# Patient Record
Sex: FEMALE | Race: WHITE
[De-identification: names, ages, dates, MRNs, and addresses within clinical notes are randomized per-mention and may not be internally consistent; named-entity substitution may affect disease eponyms.]

---

## 2018-09-13 ENCOUNTER — HOSPITAL ENCOUNTER (EMERGENCY)
Dept: HOSPITAL 84 - D.ER | Age: 53
Discharge: HOME | End: 2018-09-13
Payer: SELF-PAY

## 2018-09-13 VITALS
HEIGHT: 64 IN | BODY MASS INDEX: 33.87 KG/M2 | WEIGHT: 198.42 LBS | BODY MASS INDEX: 33.87 KG/M2 | WEIGHT: 198.42 LBS | HEIGHT: 64 IN

## 2018-09-13 VITALS — DIASTOLIC BLOOD PRESSURE: 98 MMHG | SYSTOLIC BLOOD PRESSURE: 190 MMHG

## 2018-09-13 DIAGNOSIS — L02.214: Primary | ICD-10-CM

## 2018-10-25 ENCOUNTER — HOSPITAL ENCOUNTER (EMERGENCY)
Dept: HOSPITAL 84 - D.ER | Age: 53
Discharge: HOME | End: 2018-10-25
Payer: COMMERCIAL

## 2018-10-25 VITALS — DIASTOLIC BLOOD PRESSURE: 78 MMHG | SYSTOLIC BLOOD PRESSURE: 164 MMHG

## 2018-10-25 VITALS — HEIGHT: 64 IN | WEIGHT: 192.4 LBS | BODY MASS INDEX: 32.85 KG/M2

## 2018-10-25 DIAGNOSIS — R73.9: ICD-10-CM

## 2018-10-25 DIAGNOSIS — R07.9: ICD-10-CM

## 2018-10-25 DIAGNOSIS — R20.2: ICD-10-CM

## 2018-10-25 DIAGNOSIS — R06.02: ICD-10-CM

## 2018-10-25 DIAGNOSIS — F17.200: ICD-10-CM

## 2018-10-25 DIAGNOSIS — I10: Primary | ICD-10-CM

## 2018-10-25 LAB
ALBUMIN SERPL-MCNC: 3.5 G/DL (ref 3.4–5)
ALP SERPL-CCNC: 144 U/L (ref 46–116)
ALT SERPL-CCNC: 15 U/L (ref 10–68)
ANION GAP SERPL CALC-SCNC: 12.8 MMOL/L (ref 8–16)
APPEARANCE UR: CLEAR
BACTERIA #/AREA URNS HPF: (no result) /HPF
BASOPHILS NFR BLD AUTO: 0.9 % (ref 0–2)
BILIRUB SERPL-MCNC: 0.31 MG/DL (ref 0.2–1.3)
BILIRUB SERPL-MCNC: NEGATIVE MG/DL
BUN SERPL-MCNC: 14 MG/DL (ref 7–18)
CALCIUM SERPL-MCNC: 9.3 MG/DL (ref 8.5–10.1)
CHLORIDE SERPL-SCNC: 102 MMOL/L (ref 98–107)
CO2 SERPL-SCNC: 26.2 MMOL/L (ref 21–32)
COLOR UR: YELLOW
CREAT SERPL-MCNC: 0.8 MG/DL (ref 0.6–1.3)
EOSINOPHIL NFR BLD: 4.5 % (ref 0–7)
ERYTHROCYTE [DISTWIDTH] IN BLOOD BY AUTOMATED COUNT: 12.8 % (ref 11.5–14.5)
GLOBULIN SER-MCNC: 3.8 G/L
GLUCOSE SERPL-MCNC: 1000 MG/DL
GLUCOSE SERPL-MCNC: 381 MG/DL (ref 74–106)
HCT VFR BLD CALC: 47.9 % (ref 36–48)
HGB BLD-MCNC: 17.2 G/DL (ref 12–16)
IMM GRANULOCYTES NFR BLD: 0.4 % (ref 0–5)
KETONES UR STRIP-MCNC: NEGATIVE MG/DL
LYMPHOCYTES NFR BLD AUTO: 33.4 % (ref 15–50)
MAGNESIUM SERPL-MCNC: 1.9 MG/DL (ref 1.8–2.4)
MCH RBC QN AUTO: 31.8 PG (ref 26–34)
MCHC RBC AUTO-ENTMCNC: 35.9 G/DL (ref 31–37)
MCV RBC: 88.5 FL (ref 80–100)
MONOCYTES NFR BLD: 8.2 % (ref 2–11)
NEUTROPHILS NFR BLD AUTO: 52.6 % (ref 40–80)
NITRITE UR-MCNC: NEGATIVE MG/ML
OSMOLALITY SERPL CALC.SUM OF ELEC: 290 MOSM/KG (ref 275–300)
PH UR STRIP: 5 [PH] (ref 5–6)
PLATELET # BLD: 237 10X3/UL (ref 130–400)
PMV BLD AUTO: 11.3 FL (ref 7.4–10.4)
POTASSIUM SERPL-SCNC: 4 MMOL/L (ref 3.5–5.1)
PROT SERPL-MCNC: 7.3 G/DL (ref 6.4–8.2)
PROT UR-MCNC: NEGATIVE MG/DL
RBC # BLD AUTO: 5.41 10X6/UL (ref 4–5.4)
RBC #/AREA URNS HPF: (no result) /HPF (ref 0–5)
SODIUM SERPL-SCNC: 137 MMOL/L (ref 136–145)
SP GR UR STRIP: 1.02 (ref 1–1.02)
SQUAMOUS #/AREA URNS HPF: (no result) /HPF (ref 0–5)
TROPONIN I SERPL-MCNC: < 0.017 NG/ML (ref 0–0.06)
UROBILINOGEN UR-MCNC: NORMAL MG/DL
WBC # BLD AUTO: 9.7 10X3/UL (ref 4.8–10.8)
WBC #/AREA URNS HPF: (no result) /HPF (ref 0–5)

## 2019-07-02 ENCOUNTER — HOSPITAL ENCOUNTER (OUTPATIENT)
Dept: HOSPITAL 84 - D.MAMMO | Age: 54
Discharge: HOME | End: 2019-07-02
Attending: NURSE PRACTITIONER
Payer: COMMERCIAL

## 2019-07-02 VITALS — BODY MASS INDEX: 33 KG/M2

## 2019-07-02 DIAGNOSIS — Z12.31: Primary | ICD-10-CM

## 2019-08-01 ENCOUNTER — HOSPITAL ENCOUNTER (OUTPATIENT)
Dept: HOSPITAL 84 - D.ER | Age: 54
Setting detail: OBSERVATION
LOS: 1 days | Discharge: HOME | End: 2019-08-02
Attending: INTERNAL MEDICINE | Admitting: INTERNAL MEDICINE
Payer: COMMERCIAL

## 2019-08-01 VITALS — BODY MASS INDEX: 33.02 KG/M2 | HEIGHT: 64 IN | BODY MASS INDEX: 33.02 KG/M2 | WEIGHT: 193.4 LBS

## 2019-08-01 VITALS — SYSTOLIC BLOOD PRESSURE: 132 MMHG | DIASTOLIC BLOOD PRESSURE: 62 MMHG

## 2019-08-01 VITALS — SYSTOLIC BLOOD PRESSURE: 148 MMHG | DIASTOLIC BLOOD PRESSURE: 76 MMHG

## 2019-08-01 VITALS — DIASTOLIC BLOOD PRESSURE: 70 MMHG | SYSTOLIC BLOOD PRESSURE: 131 MMHG

## 2019-08-01 VITALS — SYSTOLIC BLOOD PRESSURE: 163 MMHG | DIASTOLIC BLOOD PRESSURE: 81 MMHG

## 2019-08-01 VITALS — SYSTOLIC BLOOD PRESSURE: 144 MMHG | DIASTOLIC BLOOD PRESSURE: 81 MMHG

## 2019-08-01 VITALS — SYSTOLIC BLOOD PRESSURE: 161 MMHG | DIASTOLIC BLOOD PRESSURE: 67 MMHG

## 2019-08-01 DIAGNOSIS — F17.203: ICD-10-CM

## 2019-08-01 DIAGNOSIS — E11.9: ICD-10-CM

## 2019-08-01 DIAGNOSIS — E11.42: ICD-10-CM

## 2019-08-01 DIAGNOSIS — N17.9: ICD-10-CM

## 2019-08-01 DIAGNOSIS — R07.9: ICD-10-CM

## 2019-08-01 DIAGNOSIS — E83.42: ICD-10-CM

## 2019-08-01 DIAGNOSIS — I10: Primary | ICD-10-CM

## 2019-08-01 LAB
ALBUMIN SERPL-MCNC: 3.2 G/DL (ref 3.4–5)
ALP SERPL-CCNC: 126 U/L (ref 46–116)
ALT SERPL-CCNC: 12 U/L (ref 10–68)
AMORPHOUS SEDIMENT: (no result) /LPF
AMPHETAMINES UR QL SCN: NEGATIVE QUAL
ANION GAP SERPL CALC-SCNC: 10 MMOL/L (ref 8–16)
APPEARANCE UR: (no result)
BACTERIA #/AREA URNS HPF: (no result) /HPF
BARBITURATES UR QL SCN: NEGATIVE QUAL
BASOPHILS NFR BLD AUTO: 1 % (ref 0–2)
BENZODIAZ UR QL SCN: NEGATIVE QUAL
BILIRUB SERPL-MCNC: 0.39 MG/DL (ref 0.2–1.3)
BILIRUB SERPL-MCNC: NEGATIVE MG/DL
BUN SERPL-MCNC: 20 MG/DL (ref 7–18)
BZE UR QL SCN: NEGATIVE QUAL
CALCIUM SERPL-MCNC: 8.7 MG/DL (ref 8.5–10.1)
CANNABINOIDS UR QL SCN: NEGATIVE QUAL
CHLORIDE SERPL-SCNC: 101 MMOL/L (ref 98–107)
CK MB SERPL-MCNC: 0.5 U/L (ref 0–3.6)
CK MB SERPL-MCNC: 0.7 U/L (ref 0–3.6)
CK SERPL-CCNC: 31 UL (ref 21–215)
CK SERPL-CCNC: 33 UL (ref 21–215)
CK SERPL-CCNC: 38 UL (ref 21–215)
CK SERPL-CCNC: 49 UL (ref 21–215)
CO2 SERPL-SCNC: 30 MMOL/L (ref 21–32)
COLOR UR: YELLOW
CREAT SERPL-MCNC: 0.7 MG/DL (ref 0.6–1.3)
EOSINOPHIL NFR BLD: 4.3 % (ref 0–7)
ERYTHROCYTE [DISTWIDTH] IN BLOOD BY AUTOMATED COUNT: 12.4 % (ref 11.5–14.5)
EST. AVERAGE GLUCOSE BLD GHB EST-MCNC: 243 MG/DL (ref 74–154)
GLOBULIN SER-MCNC: 4 G/L
GLUCOSE SERPL-MCNC: 271 MG/DL (ref 74–106)
GLUCOSE SERPL-MCNC: 500 MG/DL
HCT VFR BLD CALC: 45.4 % (ref 36–48)
HGB BLD-MCNC: 16.8 G/DL (ref 12–16)
IMM GRANULOCYTES NFR BLD: 0.2 % (ref 0–5)
KETONES UR STRIP-MCNC: NEGATIVE MG/DL
LYMPHOCYTES NFR BLD AUTO: 26.5 % (ref 15–50)
MAGNESIUM SERPL-MCNC: 1.4 MG/DL (ref 1.8–2.4)
MCH RBC QN AUTO: 32.2 PG (ref 26–34)
MCHC RBC AUTO-ENTMCNC: 37 G/DL (ref 31–37)
MCV RBC: 87 FL (ref 80–100)
MONOCYTES NFR BLD: 8.8 % (ref 2–11)
MUCOUS THREADS #/AREA URNS LPF: (no result) /LPF
NEUTROPHILS NFR BLD AUTO: 59.2 % (ref 40–80)
NITRITE UR-MCNC: NEGATIVE MG/ML
OPIATES UR QL SCN: NEGATIVE QUAL
OSMOLALITY SERPL CALC.SUM OF ELEC: 286 MOSM/KG (ref 275–300)
PCP UR QL SCN: NEGATIVE QUAL
PH UR STRIP: 5 [PH] (ref 5–6)
PLATELET # BLD: 226 10X3/UL (ref 130–400)
PMV BLD AUTO: 11.1 FL (ref 7.4–10.4)
POTASSIUM SERPL-SCNC: 4 MMOL/L (ref 3.5–5.1)
PROT SERPL-MCNC: 7.2 G/DL (ref 6.4–8.2)
PROT UR-MCNC: NEGATIVE MG/DL
RBC # BLD AUTO: 5.22 10X6/UL (ref 4–5.4)
RBC #/AREA URNS HPF: (no result) /HPF (ref 0–5)
SODIUM SERPL-SCNC: 137 MMOL/L (ref 136–145)
SP GR UR STRIP: 1.02 (ref 1–1.02)
SQUAMOUS #/AREA URNS HPF: (no result) /HPF (ref 0–5)
TROPONIN I SERPL-MCNC: < 0.017 NG/ML (ref 0–0.06)
UROBILINOGEN UR-MCNC: 1 MG/DL
WBC # BLD AUTO: 9.9 10X3/UL (ref 4.8–10.8)

## 2019-08-01 NOTE — NUR
PT FSBS 
4 UNITS GIVEN AS ORDERED
SNACK PROVIDED. NIGHT MEDICATIONS GIVEN. PT VERBALIZED UNDERSTANDING OF
MEDICATIONS. PT IS AAO. DENIES ANY NEEDS. WILL CPOC

## 2019-08-01 NOTE — NUR
RECEIVED PT TO ROOM 2123 VIA STRETCHER, PT WAS ALBE TO TRANSFERED SELF TO BED
WITH STEADY GATE. ORIENTED PT TO ROOM AND CALL LIGHT. PLACED HEART MONITOR ON.
PT A/O X4, RESP EVEN AND NONLABORED ON 2L. PT C/O HEADACHE WITH PAIN LEVEL OF
8/10. LT HAND IV SL. WILL ASSESS PT AND START PLAN OF CARE.

## 2019-08-01 NOTE — EC
PATIENT:ANDREW VILLALOBOS                   DATE OF SERVICE: 08/01/19
SEX: F                                  MEDICAL RECORD: [id number]
DATE OF BIRTH: [birth year]                        LOCATION:D.      D.212
AGE OF PATIENT: 54                             ADMISSION DATE: 08/01/19
 
REFERRING PHYSICIAN:                               
 
INTERPRETING PHYSICIAN: JOSE STARR MD             
 
 
 
                             ECHOCARDIOGRAM REPORT
  ECHO CHARGES 4               ECHO COMPLETE                 Date: 08/01/19
 
 
 
CLINICAL DIAGNOSIS: CP, TIA                       
 
                         ECHOCARDIOGRAPHIC MEASUREMENTS
      (adult normal given)
   AC root (d.<3.7cm) 2.4  cm   LV Septum d (<1.2 cm> 1.4  cm
      Valve Excursion 1.4  cm     LV Septum (systole) 1.8  cm
Left Atria (s.<4.0cm> 3.7  cm          LVPW d(<1.2cm) 0.9  cm
        RV (d.<2.3cm) 2.9  cm           LVPW (sytole) 1.3  cm
  LV diastole(<5.6CM) 4.6  cm       MV E-F(>70mm/sec)      cm
           LV systole 2.7  cm           LVOT Diameter 1.4  cm
       MV exc.(>10mm)      cm
Est.ejection fraction (50-75%)     %
 
   DOPPLER:
     LVIT      cm/sec A 107  cm/sec E 78    cm/sec
       LA      cm/sec      RVSP 17.0 mmHg
     LVOT 136  cm/sec   AOP1/2T      m/s
  Asc. Ao 171  cm/sec
     RVOT 71   cm/sec
       RA      cm/sec
       PA 92   cm/sec
 AV Gradient Peak 11.7 mmHg  AV Mean 6.4  mmHg  AV Area 1.2  cm
 MV Gradient Peak 4.5  mmHg  MV Mean 1.9  mmHg  MV Area      cm
   COMMENTS:                                              
 
 
 Cardiac Sonographer: Ernestina BLANDON             
      Cardiologist: 1          Dr. Starr                
             TAPE# PACS           
                                       Pericardial Effusion N                        
 
 
DATE OF SERVICE:  08/01/2019
 
PROCEDURE:  Echocardiogram.
 
FINDINGS:
1.  Left ventricular chamber size is within normal limits.  Left ventricular
systolic function is normal.  Overall ejection fraction estimated at 60%.
2.  Left atrium, right atrium and right ventricular chamber sizes are within
normal limits.
3.  Valvular structures have normal structure and motion.
 
 
 
ECHOCARDIOGRAM REPORT                          [id number]    ANDREW VILLALOBOS           
 
 
4.  Doppler interrogation reveals trace mitral regurgitation, trace tricuspid
regurgitation, no other valvular insufficiency or stenosis.  Pulmonary systolic
pressure is normal estimated at 17 mmHg.
5.  No evidence of pericardial effusion or left ventricular thrombus.
 
TRANSINT:[id number] Voice Confirmation ID: [id number] DOCUMENT ID: [id number]
                                           
                                           JOSE STARR MD             
 
 
 
 
 
 
 
 
 
 
 
 
 
 
 
 
 
 
 
 
 
 
 
 
 
 
 
 
 
 
 
 
 
 
 
 
 
 
CC:                                                             [id number]
DICTATION DATE: 08/02/19 1212     :     08/02/19 1241      ADM IN  
                                                                              
[address] [place] [address]

## 2019-08-01 NOTE — NUR
PT RESTING IN BED. ALERT AND LAYING ON RIGHT SIDE WATCHING TV. PT ASKING FOR
CRACKERS AND A LEMON LIME. PT RECEIVED. PT HAS NO S/S OF DISTRESS. BED LOW AND
CALL LIGHT IN REACH. NAME AND DATE PLACED ON BOARD. WILL CPOC

## 2019-08-02 VITALS — DIASTOLIC BLOOD PRESSURE: 83 MMHG | SYSTOLIC BLOOD PRESSURE: 141 MMHG

## 2019-08-02 VITALS — DIASTOLIC BLOOD PRESSURE: 76 MMHG | SYSTOLIC BLOOD PRESSURE: 129 MMHG

## 2019-08-02 VITALS — SYSTOLIC BLOOD PRESSURE: 134 MMHG | DIASTOLIC BLOOD PRESSURE: 77 MMHG

## 2019-08-02 LAB
ANION GAP SERPL CALC-SCNC: 12 MMOL/L (ref 8–16)
BASOPHILS NFR BLD AUTO: 0.7 % (ref 0–2)
BUN SERPL-MCNC: 23 MG/DL (ref 7–18)
CALCIUM SERPL-MCNC: 8.1 MG/DL (ref 8.5–10.1)
CHLORIDE SERPL-SCNC: 102 MMOL/L (ref 98–107)
CO2 SERPL-SCNC: 27.4 MMOL/L (ref 21–32)
CREAT SERPL-MCNC: 0.7 MG/DL (ref 0.6–1.3)
EOSINOPHIL NFR BLD: 4.1 % (ref 0–7)
ERYTHROCYTE [DISTWIDTH] IN BLOOD BY AUTOMATED COUNT: 12.7 % (ref 11.5–14.5)
GLUCOSE SERPL-MCNC: 266 MG/DL (ref 74–106)
HCT VFR BLD CALC: 44.3 % (ref 36–48)
HGB BLD-MCNC: 15.7 G/DL (ref 12–16)
IMM GRANULOCYTES NFR BLD: 0.2 % (ref 0–5)
LYMPHOCYTES NFR BLD AUTO: 33.5 % (ref 15–50)
MCH RBC QN AUTO: 31.1 PG (ref 26–34)
MCHC RBC AUTO-ENTMCNC: 35.4 G/DL (ref 31–37)
MCV RBC: 87.7 FL (ref 80–100)
MONOCYTES NFR BLD: 8.5 % (ref 2–11)
NEUTROPHILS NFR BLD AUTO: 53 % (ref 40–80)
OSMOLALITY SERPL CALC.SUM OF ELEC: 288 MOSM/KG (ref 275–300)
PLATELET # BLD: 194 10X3/UL (ref 130–400)
PMV BLD AUTO: 11.5 FL (ref 7.4–10.4)
POTASSIUM SERPL-SCNC: 3.4 MMOL/L (ref 3.5–5.1)
RBC # BLD AUTO: 5.05 10X6/UL (ref 4–5.4)
SODIUM SERPL-SCNC: 138 MMOL/L (ref 136–145)
WBC # BLD AUTO: 8.3 10X3/UL (ref 4.8–10.8)

## 2019-08-02 NOTE — MORECARE
CASE MANAGEMENT DISCHARGE SUMMARY
 
 
PATIENT: ANDREW VILLALOBOS                       UNIT: [id number]
ACCOUNT#: [id number]                       ADM DATE: 19
AGE: 54     : 65  SEX: F            ROOM/BED: D.2123    
AUTHOR: JAMILAH MCKEE                             PHYSICIAN:                               
 
REFERRING PHYSICIAN: GINO CHAIDEZ MD               
DATE OF SERVICE: 19
Discharge Plan
 
 
Patient Name: ANDREW VILLALOBOS
Facility: [facility]:[place]
Encounter #: [id number]
Medical Record #: [id number]
: 1965
Planned Disposition: Home
Anticipated Discharge Date: 19
 
Discharge Date: 2019
Expected LOS: 1
Initial Reviewer: [email or web address]
Initial Review Date: 2019
Generated: 19   6:06 pm 
  
 
 
 
 
 
 
 
Patient Name: ANDREW VILLALOBOS
 
Encounter #: [id number]
Page [id number]
 
 
 
 
 
Electronically Signed by JAMILAH MCKEE on 19 at 1706
 
 
 
 
 
 
**All edits/amendments must be made on the electronic document**
 
DICTATION DATE: 19     : DONNA  19     
RPT#: [id number]                                DC DATE:19
                                               STATUS: DIS IN  
[facility]
[address] [place] [address]
***END OF REPORT***

## 2019-08-02 NOTE — NUR
ASSESSMENT COMPLETED. ALERT AND ORIENTED. DENIES ANY NEEDS. NO PAIN. TELEMERTY
SHOWS SB. O2 AT 2 L/M PER NC. UP AB FRANC. WANTING TO GO HOME. NOTIFIED ARGELIA MORRIS

## 2019-08-02 NOTE — NUR
PLACED ORDER FOR ELECT. PROTOCOL AS WRITTEN IN PLAN. PT MAG WAS LOW YESTURDAY.
WAITING FOR LABS THIS MORNING. WILL REPLACE AS ORDERED

## 2019-08-02 NOTE — NUR
PT LAYING IN BED WITH EYES CLOSED. 2L O2 NC. RESP EVEN AND UNLABORED. NO S/S
OF DISTRESS. BED LOW AND CALL LIGHT IN REACH. WILL CPOC

## 2019-08-02 NOTE — NUR
10 UNITS GIVEN FOR A FSBS 
SNACK PROVIDED.
PT MAG IS 1.6 TREATED AS ORDERED PER PROTOCOL 400MG
POTASSIUM 3.4 TREATED PER PROTOCOL 10 MEQ
PT VERBALIZED UNDERSTANDING. WILL CPOC